# Patient Record
Sex: MALE | Race: WHITE | NOT HISPANIC OR LATINO | Employment: OTHER | URBAN - METROPOLITAN AREA
[De-identification: names, ages, dates, MRNs, and addresses within clinical notes are randomized per-mention and may not be internally consistent; named-entity substitution may affect disease eponyms.]

---

## 2019-06-10 ENCOUNTER — TRANSCRIBE ORDERS (OUTPATIENT)
Dept: PHYSICAL THERAPY | Facility: CLINIC | Age: 64
End: 2019-06-10

## 2019-06-10 ENCOUNTER — EVALUATION (OUTPATIENT)
Dept: PHYSICAL THERAPY | Facility: CLINIC | Age: 64
End: 2019-06-10
Payer: MEDICARE

## 2019-06-10 DIAGNOSIS — Z96.652 S/P TKR (TOTAL KNEE REPLACEMENT), LEFT: Primary | ICD-10-CM

## 2019-06-10 PROCEDURE — 97140 MANUAL THERAPY 1/> REGIONS: CPT | Performed by: PHYSICAL THERAPIST

## 2019-06-10 PROCEDURE — 97110 THERAPEUTIC EXERCISES: CPT | Performed by: PHYSICAL THERAPIST

## 2019-06-10 PROCEDURE — 97010 HOT OR COLD PACKS THERAPY: CPT | Performed by: PHYSICAL THERAPIST

## 2019-06-10 PROCEDURE — 97162 PT EVAL MOD COMPLEX 30 MIN: CPT | Performed by: PHYSICAL THERAPIST

## 2019-06-12 ENCOUNTER — OFFICE VISIT (OUTPATIENT)
Dept: PHYSICAL THERAPY | Facility: CLINIC | Age: 64
End: 2019-06-12
Payer: MEDICARE

## 2019-06-12 DIAGNOSIS — Z96.652 S/P TKR (TOTAL KNEE REPLACEMENT), LEFT: Primary | ICD-10-CM

## 2019-06-12 PROCEDURE — 97010 HOT OR COLD PACKS THERAPY: CPT

## 2019-06-12 PROCEDURE — 97110 THERAPEUTIC EXERCISES: CPT

## 2019-06-12 PROCEDURE — 97140 MANUAL THERAPY 1/> REGIONS: CPT

## 2019-06-13 ENCOUNTER — OFFICE VISIT (OUTPATIENT)
Dept: PHYSICAL THERAPY | Facility: CLINIC | Age: 64
End: 2019-06-13
Payer: MEDICARE

## 2019-06-13 DIAGNOSIS — Z96.652 S/P TKR (TOTAL KNEE REPLACEMENT), LEFT: Primary | ICD-10-CM

## 2019-06-13 PROCEDURE — 97140 MANUAL THERAPY 1/> REGIONS: CPT

## 2019-06-13 PROCEDURE — 97110 THERAPEUTIC EXERCISES: CPT

## 2019-06-13 PROCEDURE — 97010 HOT OR COLD PACKS THERAPY: CPT

## 2019-06-17 ENCOUNTER — OFFICE VISIT (OUTPATIENT)
Dept: PHYSICAL THERAPY | Facility: CLINIC | Age: 64
End: 2019-06-17
Payer: MEDICARE

## 2019-06-17 DIAGNOSIS — Z96.652 S/P TKR (TOTAL KNEE REPLACEMENT), LEFT: Primary | ICD-10-CM

## 2019-06-17 PROCEDURE — 97110 THERAPEUTIC EXERCISES: CPT

## 2019-06-17 PROCEDURE — 97140 MANUAL THERAPY 1/> REGIONS: CPT

## 2019-06-17 PROCEDURE — 97010 HOT OR COLD PACKS THERAPY: CPT

## 2019-06-19 ENCOUNTER — OFFICE VISIT (OUTPATIENT)
Dept: PHYSICAL THERAPY | Facility: CLINIC | Age: 64
End: 2019-06-19
Payer: MEDICARE

## 2019-06-19 DIAGNOSIS — Z96.652 S/P TKR (TOTAL KNEE REPLACEMENT), LEFT: Primary | ICD-10-CM

## 2019-06-19 PROCEDURE — 97110 THERAPEUTIC EXERCISES: CPT | Performed by: PHYSICAL THERAPIST

## 2019-06-19 PROCEDURE — 97140 MANUAL THERAPY 1/> REGIONS: CPT | Performed by: PHYSICAL THERAPIST

## 2019-06-21 ENCOUNTER — OFFICE VISIT (OUTPATIENT)
Dept: PHYSICAL THERAPY | Facility: CLINIC | Age: 64
End: 2019-06-21
Payer: MEDICARE

## 2019-06-21 DIAGNOSIS — Z96.652 S/P TKR (TOTAL KNEE REPLACEMENT), LEFT: Primary | ICD-10-CM

## 2019-06-21 PROCEDURE — 97140 MANUAL THERAPY 1/> REGIONS: CPT | Performed by: PHYSICAL THERAPIST

## 2019-06-21 PROCEDURE — 97110 THERAPEUTIC EXERCISES: CPT | Performed by: PHYSICAL THERAPIST

## 2019-06-24 ENCOUNTER — OFFICE VISIT (OUTPATIENT)
Dept: PHYSICAL THERAPY | Facility: CLINIC | Age: 64
End: 2019-06-24
Payer: MEDICARE

## 2019-06-24 DIAGNOSIS — Z96.652 S/P TKR (TOTAL KNEE REPLACEMENT), LEFT: Primary | ICD-10-CM

## 2019-06-24 PROCEDURE — 97140 MANUAL THERAPY 1/> REGIONS: CPT

## 2019-06-24 PROCEDURE — 97110 THERAPEUTIC EXERCISES: CPT

## 2019-06-28 ENCOUNTER — OFFICE VISIT (OUTPATIENT)
Dept: PHYSICAL THERAPY | Facility: CLINIC | Age: 64
End: 2019-06-28
Payer: MEDICARE

## 2019-06-28 DIAGNOSIS — Z96.652 S/P TKR (TOTAL KNEE REPLACEMENT), LEFT: Primary | ICD-10-CM

## 2019-06-28 PROCEDURE — 97110 THERAPEUTIC EXERCISES: CPT

## 2019-06-28 PROCEDURE — 97140 MANUAL THERAPY 1/> REGIONS: CPT

## 2019-07-01 ENCOUNTER — OFFICE VISIT (OUTPATIENT)
Dept: PHYSICAL THERAPY | Facility: CLINIC | Age: 64
End: 2019-07-01
Payer: MEDICARE

## 2019-07-01 DIAGNOSIS — Z96.652 S/P TKR (TOTAL KNEE REPLACEMENT), LEFT: Primary | ICD-10-CM

## 2019-07-01 PROCEDURE — 97110 THERAPEUTIC EXERCISES: CPT

## 2019-07-01 PROCEDURE — 97140 MANUAL THERAPY 1/> REGIONS: CPT

## 2019-07-01 NOTE — PROGRESS NOTES
Daily Note     Today's date: 2019  Patient name: Ricardo Sherman  : 1955  MRN: [id number]  Referring provider: Bruce Bustos MD  Dx:   Encounter Diagnosis     ICD-10-CM    1  S/P TKR (total knee replacement), left Z96 652        Start Time: 0800  Stop Time: 0915  Total time in clinic (min): 75 minutes    Subjective: Pt continues with stiffness of his L knee  Objective: See treatment diary below  [facility]    PROM/AAROM 5  5  10 10  5   Slide stretch 5  5  5  5  5   Seated stretch 5  5  ----> --->  5                       Exercise Diary     Quad sets 40X  40x  40x  40x  40/40   Strap stretch  5X 30'    5x 30"  5x 30"  3X 60'   Heel slides   3#  3#   2#    T-band press LV4 215  L4 2/15  L5 2/15  L5 2/15  L% 3/15   SLR w hangs 5/5 2 hangs #3  5/5 2 hangs  5/5 2  Hangs 3# 3# 5/5  2 hangs   2# 5/5    2 hangs   SAQ 3# 3/10  3# 3/10  3# 3/10  3# 3/10  2# 4/10   LAQ 3# 3/10  3# 3/10  3# 3/10  3# 3/10  --->   T-band hams L4 2/10  L4 2/10  L4 2/10  L4 2/10  LV4 3/10    T-band TKE  LV4 3/10  L4 3/10  L4 3/10  L4 3/10 L4 3/10    Standing             Calf raise    Chair squat 2/10  xxxx  xxxx xxxxx  3/10   Ball squat opt        Lunge         5X 20'                 Bike seat 19 15 min LV3 seat 18-23 15m L3     15m L3  15m L3  L# 15m                 Nu Step  Seat 10-8   ------> -------> ---->      TM    10m 1  3mph  10m 1  8mph  10m   1  8mph                         Modalities      CP post  tk  15  15 15          Assessment: Tolerated treatment well  Patient exhibited good technique with therapeutic exercises      Plan: Continue per plan of care

## 2019-07-05 ENCOUNTER — OFFICE VISIT (OUTPATIENT)
Dept: PHYSICAL THERAPY | Facility: CLINIC | Age: 64
End: 2019-07-05
Payer: MEDICARE

## 2019-07-05 DIAGNOSIS — Z96.652 S/P TKR (TOTAL KNEE REPLACEMENT), LEFT: Primary | ICD-10-CM

## 2019-07-05 PROCEDURE — 97110 THERAPEUTIC EXERCISES: CPT

## 2019-07-05 PROCEDURE — 97140 MANUAL THERAPY 1/> REGIONS: CPT

## 2019-07-05 NOTE — PROGRESS NOTES
Daily Note     Today's date: 2019  Patient name: Hernandez Baker  : 1955  MRN: [id number]  Referring provider: Hussein Fernandez MD  Dx:   Encounter Diagnosis     ICD-10-CM    1  S/P TKR (total knee replacement), left Z96 652        Start Time: 0800  Stop Time: 09  Total time in clinic (min): 65 minutes    Subjective: Patient stated that he had seem surgeon and was instructed to continue with PT , emphasis on quad strengthening  Surgeon was pleased with patient progress  Objective: PTA directed patient in TE program, See treatment diary below  Modified TE program, due to patient time restraints  Assessment: Tolerated treatment well  Patient exhibited good technique with therapeutic exercises, no pain post treat  Plan: Continue per plan of care  Manual     PROM/AAROM 5  5  10 10  10   Slide stretch 5  5  5  5  5   Seated stretch 5  5  ----> --->                         Exercise Diary   7   Quad sets 40X  40x  40x  40x  40   Strap stretch  5X 30'    5x 30"  5x 30"  5X 30'   Heel slides   3#  3#   3#    T-band press LV4 2/15  L4 2/15  L5 2/15  L5 2/15 --->   SLR w hangs 5/5 2 hangs #3  5/5 2 hangs  5/5 2  Hangs 3# 3# 5/5  2 hangs   3# 5/5    2 hangs   SAQ 3# 3/10  3# 3/10  3# 3/10  3# 3/10 --->   LAQ 3# 3/10  3# 3/10  3# 3/10  3# 3/10 3# 3/10   T-band hams L4 2/10  L4 2/10  L4 2/10  L4 2/10  LV4 3/10    T-band TKE  LV4 3/10  L4 3/10  L4 3/10  L4 3/10 L4 3/10    Standing             Calf raise    Chair squat 2/10  xxxx  xxxx xxxxx xxxxx   Antione Kanner squat opt        Lunge         5X 20'                 Bike seat 19 15 min LV3 seat 18-23 15m L3     15m L3  15m L3  L3 15m                 Nu Step  Seat 10-8   ------> -------> ---->      TM    10m 1  3mph  10m 1  8mph  10m   1  8mph  --->                       Modalities   7/   CP post  tk  15  15 15 declined

## 2019-07-08 ENCOUNTER — OFFICE VISIT (OUTPATIENT)
Dept: PHYSICAL THERAPY | Facility: CLINIC | Age: 64
End: 2019-07-08
Payer: MEDICARE

## 2019-07-08 DIAGNOSIS — Z96.652 S/P TKR (TOTAL KNEE REPLACEMENT), LEFT: Primary | ICD-10-CM

## 2019-07-08 PROCEDURE — 97110 THERAPEUTIC EXERCISES: CPT

## 2019-07-08 PROCEDURE — 97140 MANUAL THERAPY 1/> REGIONS: CPT

## 2019-07-08 NOTE — PROGRESS NOTES
Daily Note     Today's date: 2019  Patient name: Madelaine Cesar  : 1955  MRN: [id number]  Referring provider: Hernandez Greenberg MD  Dx:   Encounter Diagnosis     ICD-10-CM    1  S/P TKR (total knee replacement), left Z96 652        Start Time: 0800  Stop Time: 0615  Total time in clinic (min): 1335 minutes    Subjective:Pt states he is pleased with is progress thus far  Objective: See treatment diary below      Assessment: Tolerated treatment well  Patient exhibited good technique with therapeutic exercises      Plan: Continue per plan of care  Manual     PROM/AAROM 10  5  10 10  10   Slide stretch 5  5  5  5  5   Seated stretch   5  ----> --->                         Exercise Diary     Quad sets 40x  40x  40x  40x  40   Strap stretch  5x 30'    5x 30"  5x 30"  5X 30'   Heel slides 3#   3#  3#   3#    T-band press L5 2/15  L4 2/15  L5 2/15  L5 2/15 --->   SLR w hangs 3# 5/5   2 hangs  5/5 2 hangs  5/5 2  Hangs 3# 3# 5/5  2 hangs   3# 5/5    2 hangs   SAQ 4# 3/10  3# 3/10  3# 3/10  3# 3/10 --->   LAQ 4# 3/10  3# 3/10  3# 3/10  3# 3/10 3# 3/10   T-band hams L5 2/10  L4 2/10  L4 2/10  L4 2/10  LV4 3/10    T-band TKE  L4 3/10  L4 3/10  L4 3/10  L4 3/10 L4 3/10    Standing             Calf raise    Chair squat xxxx  xxxx  xxxx xxxxx xxxxx   [place] squat opt  2/10 2/20 2/20  2/20     Lunge  xxxx       5X 20'                 Bike seat  15 min L3 s- 18-23 15m L3     15m L3  15m L3  L3 15m                 Nu Step  Seat 10-8 -------> ------> -------> ---->      TM  15m  1 8 mph  10m 1  3mph  10m 1  8mph  10m   1  8mph  --->                       Modalities   7   CP post 15  15  15 15 declined

## 2019-07-10 ENCOUNTER — OFFICE VISIT (OUTPATIENT)
Dept: PHYSICAL THERAPY | Facility: CLINIC | Age: 64
End: 2019-07-10
Payer: MEDICARE

## 2019-07-10 DIAGNOSIS — Z96.652 S/P TKR (TOTAL KNEE REPLACEMENT), LEFT: Primary | ICD-10-CM

## 2019-07-10 PROCEDURE — 97110 THERAPEUTIC EXERCISES: CPT

## 2019-07-10 PROCEDURE — 97140 MANUAL THERAPY 1/> REGIONS: CPT

## 2019-07-10 NOTE — PROGRESS NOTES
Daily Note     Today's date: 7/10/2019  Patient name: Mazin Sanabria  : 1955  MRN: [id number]  Referring provider: Amita Abbott MD  Dx:   Encounter Diagnosis     ICD-10-CM    1  S/P TKR (total knee replacement), left Z96 652        Start Time: 0800  Stop Time: 0915  Total time in clinic (min): 75 minutes    Subjective: Pt feels he is making progress with his knee extension  Objective: See treatment diary below      Assessment: Tolerated treatment well  Patient exhibited good technique with therapeutic exercises      Plan: Continue per plan of care  Manual  7/8 7/10 6/28  7/1 7/5   PROM/AAROM 10  15  10 10  10   Slide stretch 5  xx  5  5  5   Seated stretch  --->  ----> --->                         Exercise Diary  7/8 7/10  6/28  7/1 7/5   Quad sets 40x  40x  40x  40x  40   Strap stretch  5x 30'  5x 30"  5x 30"  5x 30"  5X 30'   Heel slides 3#   3#   3#  3#   3#    T-band press L5 2/15  L5 2/15  L5 2/15  L5 2/15 --->   SLR w hangs 3# 5/5   2 hangs  3# 5/5 2 hangs  5/5 2  Hangs 3# 3# 5/5  2 hangs   3# 5/5    2 hangs   SAQ 4# 3/10  5# 3/10  3# 3/10  3# 3/10 --->   LAQ 4# 3/10  5# 3/10  3# 3/10  3# 3/10 3# 3/10   T-band hams L5 2/10  L5 3/10  L4 2/10  L4 2/10  LV4 3/10    T-band TKE  L4 3/10  L5 2/10  L4 3/10  L4 3/10 L4 3/10    Standing             Calf raise    Chair squat xxxx  xxxx  xxxx xxxxx xxxxx   [place] squat opt  2/10 2/20 2/20  2/20     Lunge  xxxx       5X 20'                  Bike seat  15m L3 15m L3     15m L3  15m L3  L3 15m                 Nu Step  Seat 10-8 -------> ------> -------> ---->      TM  15m  1 8 mph  15m 1  8mph  10m 1  8mph  10m   1  8mph  --->                       Modalities 7/8  7/10  6/28  7/1  7   CP post 15  12  15 15 declined

## 2019-07-12 ENCOUNTER — APPOINTMENT (OUTPATIENT)
Dept: PHYSICAL THERAPY | Facility: CLINIC | Age: 64
End: 2019-07-12
Payer: MEDICARE

## 2019-07-15 ENCOUNTER — OFFICE VISIT (OUTPATIENT)
Dept: PHYSICAL THERAPY | Facility: CLINIC | Age: 64
End: 2019-07-15
Payer: MEDICARE

## 2019-07-15 DIAGNOSIS — Z96.652 S/P TKR (TOTAL KNEE REPLACEMENT), LEFT: Primary | ICD-10-CM

## 2019-07-15 PROCEDURE — 97110 THERAPEUTIC EXERCISES: CPT

## 2019-07-15 PROCEDURE — 97140 MANUAL THERAPY 1/> REGIONS: CPT

## 2019-07-15 NOTE — PROGRESS NOTES
Daily Note     Today's date: 7/15/2019  Patient name: Michael Corley  : 1955  MRN: [id number]  Referring provider: Froylan Arthur MD  Dx:   Encounter Diagnosis     ICD-10-CM    1  S/P TKR (total knee replacement), left Z96 652        Start Time: 0800  Stop Time: 0915  Total time in clinic (min): 75 minutes    Subjective: MD F/VANESSA 9/3/2019  Pt states he has been able to vivian increased home ADL's  Objective: See treatment diary below      Assessment: Tolerated treatment well  Patient exhibited good technique with therapeutic exercises including transition to lf for press/hamstring  Plan: Continue per plan of care  Manual  7/8 7/10 7/15  7/1 7/5   PROM/AAROM 10  15 15 10  10   Slide stretch 5  xx xx  5  5   Seated stretch  --->  ----> --->                         Exercise Diary  7/8 7/10  7/15  7/1 7/5   Quad sets 40x  40x  40x  40x  40   Strap stretch  5x 30'  5x 30"  5x 30"  5x 30"  5X 30'   Heel slides 3# 2/20  3# 2/20  3# 2/20 3# 2/20  3# 2/20   T-band press L5 2/15  L5 2/15  lf 35# 2/10  L5 15 --->   SLR w hangs 3# 5/5   2 hangs  3# 5/5 2 hangs 4# 5/5  2 hangs 3# 5/5  2 hangs   3# 5/5    2 hangs   SAQ 4# 3/10  5# 3/10  5# 3/10  3# 3/10 --->   LAQ 4# 3/10  5# 3/10  5# 3/10  3# 3/10 3# 3/10   T-band hams L5 2/10  L5 3/10  lf 40# 2/10  L4 2/10  LV4 3/10    T-band TKE  L4 3/10  L5 2/10  L5 3/10  L4 3/10 L4 3/10    Standing             Calf raise    Chair squat xxxx  xxxx  xxxx xxxxx xxxxx   [place] squat opt  2/10 2/20 ----->       Lunge  xxxx       5X 20'                  Bike seat  15m L3 15m L3     15m L3  15m L3  L3 15m                 Nu Step  Seat 10-8 -------> ------> -------> ---->      TM  15m  1 8 mph  15m 1  8mph  10m 1  8mph  10m   1  8mph  --->                       Modalities 7/8  7/10  7/15  7   CP post 15  12  15 15 declined

## 2019-07-17 ENCOUNTER — OFFICE VISIT (OUTPATIENT)
Dept: PHYSICAL THERAPY | Facility: CLINIC | Age: 64
End: 2019-07-17
Payer: MEDICARE

## 2019-07-17 ENCOUNTER — TRANSCRIBE ORDERS (OUTPATIENT)
Dept: PHYSICAL THERAPY | Facility: CLINIC | Age: 64
End: 2019-07-17

## 2019-07-17 DIAGNOSIS — Z96.652 S/P TKR (TOTAL KNEE REPLACEMENT), LEFT: Primary | ICD-10-CM

## 2019-07-17 DIAGNOSIS — Z96.652 S/P TKR (TOTAL KNEE REPLACEMENT) NOT USING CEMENT, LEFT: Primary | ICD-10-CM

## 2019-07-17 PROCEDURE — 97110 THERAPEUTIC EXERCISES: CPT | Performed by: PHYSICAL THERAPIST

## 2019-07-17 PROCEDURE — 97140 MANUAL THERAPY 1/> REGIONS: CPT | Performed by: PHYSICAL THERAPIST

## 2019-07-17 NOTE — PROGRESS NOTES
PT Re-Evaluation     Today's date: 2019  Patient name: Veronique Castaneda  : 1955  MRN: [id number]  Referring provider: Aracelis Wiley MD  Dx:   Encounter Diagnosis     ICD-10-CM    1  S/P TKR (total knee replacement), left Z96 652        Start Time: 08  Stop Time: 915  Total time in clinic (min): 70 minutes    Assessment  Assessment details: Veronique Castaneda 70 y o  male post left TKR 2019  The patient is doing well, but concerned about his extension lag  He still reports stiffness in the mornings or after extended weight bearing activity  However, the patient is functioning well, including climbing ladders and walking  He has not returned to recreational cycling at this time  Left  Knee Pain rated  2 /10  at worse during passive stretching  Minimal to no swelling L knee  L Knee PROM: F  118   E    -3  L knee AROM: F 112  E -5  L Knee Strength: F  +4  E +4  Patient is ambulating without device, and presents a fairly normal gait pattern  He has been able to ascend and descend stairs reciprocally with hand rail  Patient is active, and enjoys cycling  He would like to resume this activity when able and permitted  Goals  STG  Reduce and control left kne pain and swelling - day one and ongoing  Increase active ROM 0 - 110 2 weeks - ACHIEVED  Increase strength left knee 1/2 grade 3 weeks - ACHIEVED  LTG  Maximize active ROM left knee 0-120 by d/c  Maximize strength left knee 5/5  Promote reciprocal stair climbing in descent - Achieved  Resume normal functional and recreational activity - Partial       Plan  Plan details: Continue 2X per week 2-3 weeks  MT - stretching  TE - ROM and advanced strengthening         Subjective  The patient is doing well, but concerned about his extension lag  He still reports stiffness in the mornings or after extended weight bearing activity      Objective   L Knee PROM: F  118   E    -3  L knee AROM: F 112  E -5  L Knee Strength: F  +4  E +4 Manual  7/8 7/10 7/15  7/17 7/5   PROM/AAROM 10  15 15 10  10   Slide stretch 5  xx xx  5  5   Seated stretch   --->  ----> --->                         Exercise Diary  7/8 7/10  7/15  7/17 7/5   Quad sets 40x  40x  40x  40x  40   Strap stretch  5x 30'  5x 30"  5x 30"  5x 30"  5X 30'   Heel slides 3# 2/20  3# 2/20  3# 2/20 2/20  3# 2/20   T-band press L5 2/15  L5 2/15  lf 35# 2/10  L5 2/15 --->   SLR w hangs 3# 5/5   2 hangs  3# 5/5 2 hangs 4# 5/5  2 hangs 3# 5/5  2 hangs   3# 5/5    2 hangs   SAQ 4# 3/10  5# 3/10  5# 3/10  3# 3/10 --->   LAQ 4# 3/10  5# 3/10  5# 3/10  3# 3/10 3# 3/10   T-band hams L5 2/10  L5 3/10  lf 40# 2/10  xxxxx-LF  LV4 3/10   LF Leg Curl     45# 3/10    LF Leg Press     50# 3/10     T-band TKE  L4 3/10  L5 2/10  L5 3/10  L4 3/10 L4 3/10    Standing             Calf raise 2/20 2/20 2/20 2/20 March 2/20 2/20 2/20 2/20 2/20   Chair squat xxxx  xxxx  xxxx xxxxx xxxxx   Janell Kingman squat opt  2/10 2/20 ----->  2/20     Lunge  xxxx       5X 20'                    Bike seat  15m L3 15m L3     15m L3  15m L3  L3 15m                 Nu Step  Seat 10-8 -------> ------> -------> ---->      TM  15m  1 8 mph  15m 1  8mph  10m 1  8mph  10m   1  8mph  --->                       Modalities 7/8  7/10  7/15  7/17  7/5   CP post 15  12  15 15 declined

## 2019-07-17 NOTE — LETTER
2019    Vasiliy Rosario MD  [address] [place] [id number]    Patient: Wilfred Pardo   YOB: 1955   Date of Visit: 2019     Encounter Diagnosis     ICD-10-CM    1  S/P TKR (total knee replacement), left F99 070        Dear Dr Black Even:    Please review the attached Plan of Care from [facility] recent visit  Please verify that you agree therapy should continue by signing the attached document and sending it back to our office  If you have any questions or concerns, please don't hesitate to call  Sincerely,    Franco Delgadillo, PT      Referring Provider:      I certify that I have read the below Plan of Care and certify the need for these services furnished under this plan of treatment while under my care  Vasiliy Rosario MD  [address] [place] [id number]  VIA Facsimile: [phone]          PT Re-Evaluation     Today's date: 2019  Patient name: Wilfred Pardo  : 1955  MRN: [id number]  Referring provider: Maria Guadalupe Ulrich MD  Dx:   Encounter Diagnosis     ICD-10-CM    1  S/P TKR (total knee replacement), left Z96 652        Start Time: 08  Stop Time: 915  Total time in clinic (min): 70 minutes    Assessment  Assessment details: Wilfred Pardo 70 y o  male post left TKR 2019  The patient is doing well, but concerned about his extension lag  He still reports stiffness in the mornings or after extended weight bearing activity  However, the patient is functioning well, including climbing ladders and walking  He has not returned to recreational cycling at this time  Left  Knee Pain rated  2 /10  at worse during passive stretching  Minimal to no swelling L knee  L Knee PROM: F  118   E    -3  L knee AROM: F 112  E -5  L Knee Strength: F  +4  E +4  Patient is ambulating without device, and presents a fairly normal gait pattern  He has been able to ascend and descend stairs reciprocally with hand rail  Patient is active, and enjoys cycling  He would like to resume this activity when able and permitted  Goals  STG  Reduce and control left kne pain and swelling - day one and ongoing  Increase active ROM 0 - 110 2 weeks - ACHIEVED  Increase strength left knee 1/2 grade 3 weeks - ACHIEVED  LTG  Maximize active ROM left knee 0-120 by d/c  Maximize strength left knee 5/5  Promote reciprocal stair climbing in descent - Achieved  Resume normal functional and recreational activity - Partial       Plan  Plan details: Continue 2X per week 2-3 weeks  MT - stretching  TE - ROM and advanced strengthening         Subjective  The patient is doing well, but concerned about his extension lag  He still reports stiffness in the mornings or after extended weight bearing activity      Objective   L Knee PROM: F  118   E    -3  L knee AROM: F 112  E -5  L Knee Strength: F  +4  E +4         Manual  7/8 7/10 7/15  7/17 7/5   PROM/AAROM 10  15 15 10  10   Slide stretch 5  xx xx  5  5   Seated stretch   --->  ----> --->                         Exercise Diary  7/8 7/10  7/15  7/17 7/5   Quad sets 40x  40x  40x  40x  40   Strap stretch  5x 30'  5x 30"  5x 30"  5x 30"  5X 30'   Heel slides 3# 2/20  3# 2/20  3# 2/20 2/20  3# 2/20   T-band press L5 2/15  L5 2/15  lf 35# 2/10  L5 2/15 --->   SLR w hangs 3# 5/5   2 hangs  3# 5/5 2 hangs 4# 5/5  2 hangs 3# 5/5  2 hangs   3# 5/5    2 hangs   SAQ 4# 3/10  5# 3/10  5# 3/10  3# 3/10 --->   LAQ 4# 3/10  5# 3/10  5# 3/10  3# 3/10 3# 3/10   T-band hams L5 2/10  L5 3/10  lf 40# 2/10  xxxxx-LF  LV4 3/10   LF Leg Curl     45# 3/10    LF Leg Press     50# 3/10     T-band TKE  L4 3/10  L5 2/10  L5 3/10  L4 3/10 L4 3/10    Standing             Calf raise 2/20 2/20 2/20 2/20 March 2/20 2/20 2/20  2/20  2/20   Chair squat xxxx  xxxx  xxxx xxxxx xxxxx   Duglas Reed squat opt  2/10 2/20 ----->  2/20     Lunge  xxxx       5X 20'                    Bike seat  15m L3 15m L3     15m L3  15m L3  L3 15m                 Nu Step  Seat 10-8 -------> ------> -------> ---->      TM  15m  1 8 mph  15m 1  8mph  10m 1  8mph  10m   1  8mph  --->                       Modalities 7/8  7/10  7/15  7/17  7/5   CP post 15  12  15 15 declined

## 2019-07-19 ENCOUNTER — OFFICE VISIT (OUTPATIENT)
Dept: PHYSICAL THERAPY | Facility: CLINIC | Age: 64
End: 2019-07-19
Payer: MEDICARE

## 2019-07-19 DIAGNOSIS — Z96.652 S/P TKR (TOTAL KNEE REPLACEMENT), LEFT: Primary | ICD-10-CM

## 2019-07-19 PROCEDURE — 97110 THERAPEUTIC EXERCISES: CPT | Performed by: PHYSICAL THERAPIST

## 2019-07-19 NOTE — PROGRESS NOTES
Daily Note     Today's date: 2019  Patient name: Wilfred Pardo  : 1955  MRN: [id number]  Referring provider: Maria Guadalupe Ulrich MD  Dx:   Encounter Diagnosis     ICD-10-CM    1  S/P TKR (total knee replacement), left Z96 652        Start Time: 0800  Stop Time: 0905  Total time in clinic (min): 65 minutes    Subjective: Patient states that he is doing well  Objective: PT repeated passive stretching left knee, and directed     Assessment: Tolerated treatment well  Plan: Continue per plan of care  {  Manual  7/8 7/10 7/15  7/17 7/19   PROM/AAROM 10  15 15 10  10   Slide stretch 5  xx xx  5     Seated stretch   --->  ----> --->                         Exercise Diary  7/8 7/10  7/15  7/17 7/19   Quad sets 40x  40x  40x  40x  40   Strap stretch  5x 30'  5x 30"  5x 30"  5x 30"  5X 30'   Heel slides 3#   3#   3#   3#    T-band press L5 2/15  L5 2/15  lf 35# 2/10  L5 2/15 --->   SLR w hangs 3# 5/5   2 hangs  3# 5/5 2 hangs 4# 5/5  2 hangs 3# 5/5  2 hangs   3# 5/5    2 hangs   SAQ 4# 3/10  5# 3/10  5# 3/10  3# 3/10 --->   LAQ 4# 3/10  5# 3/10  5# 3/10  3# 3/10 7# 3/10   T-band hams L5 2/10  L5 3/10  lf 40# 2/10  xxxxx-LF  LV4 3/10   LF Leg Curl        45# 3/10  45# 3/10   LF Leg Press        50# 3/10  50# 3/10    T-band TKE  L4 3/10  L5 2/10  L5 3/10  L4 3/10 L4 3/10    Standing             Calf raise    Chair squat xxxx  xxxx  xxxx xxxxx xxxxx   Fredis Glow squat opt  2/10 2/20 ----->       Lunge  xxxx       5X 20'                    Bike seat  15m L3 15m L3     15m L3  15m L3  L3 15m                 Nu Step  Seat 10-8 -------> ------> -------> ---->      TM  15m  1 8 mph  15m 1  8mph  10m 1  8mph  10m   1  8mph  10m 2 0 mph                       Modalities 7/8  7/10  7/15  7/17  7   CP post 15  12  15 15 declined

## 2019-07-22 ENCOUNTER — OFFICE VISIT (OUTPATIENT)
Dept: PHYSICAL THERAPY | Facility: CLINIC | Age: 64
End: 2019-07-22
Payer: MEDICARE

## 2019-07-22 DIAGNOSIS — Z96.652 S/P TKR (TOTAL KNEE REPLACEMENT), LEFT: Primary | ICD-10-CM

## 2019-07-22 PROCEDURE — 97110 THERAPEUTIC EXERCISES: CPT

## 2019-07-22 PROCEDURE — 97140 MANUAL THERAPY 1/> REGIONS: CPT

## 2019-07-22 NOTE — PROGRESS NOTES
Daily Note     Today's date: 2019  Patient name: Grace Storm  : 1955  MRN: [id number]  Referring provider: Shania Cheung MD  Dx:   Encounter Diagnosis     ICD-10-CM    1  S/P TKR (total knee replacement), left Z96 652        Start Time: 0800  Stop Time: 0910  Total time in clinic (min): 70 minutes    Subjective: Pt reports he has performed       Objective: See treatment diary below      Assessment: Tolerated treatment well  Patient exhibited good technique with therapeutic exercises      Plan: Continue per plan of care  {  Manual  7/22 7/10 7/15  7/17 7/19   PROM/AAROM 10  15 15 10  10   Slide stretch 5  xx xx  5     Seated stretch   --->  ----> --->                         Exercise Diary  7/22 7/10  7/15  7/17 7/19   Quad sets 40x  40x  40x  40x  40   Strap stretch  5x 30'  5x 30"  5x 30"  5x 30"  5X 30'   Heel slides 4#   3#   3#   3#    T-band press L5 /15  L5 /15  lf 35# 2/10  L5 15 --->   SLR w hangs 4# 5/5   2 hangs  3# 5/5 2 hangs 4# 5/5  2 hangs 3# 5/5  2 hangs   3# 5/5    2 hangs   SAQ 5# 3/10  5# 3/10  5# 3/10  3# 3/10 --->   LAQ 5# 3/10  5# 3/10  5# 3/10  3# 3/10 7# 3/10   T-band hams xxx  L5 3/10  lf 40# 2/10  xxxxx-LF xxxxx   LF Leg Curl  55 3/10      45# 3/10  45# 3/10   LF Leg Press  50 3/10      50# 3/10  50# 3/10    T-band TKE home  L5 2/10  L5 3/10  L4 3/10 L4 3/10    Standing             Calf raise    Chair squat xxxx  xxxx  xxxx xxxxx xxxxx   Addie Parish squat opt  2/10 2/20 ----->       Lunge  xxxx       5X 20'                    Bike 15m L3 15m L3     15m L3  15m L3  L3 15m                 Nu Step  Seat 10-8 -------> ------> -------> ---->      TM  15m  1 8 mph  15m 1  8mph  10m 1  8mph  10m   1  8mph  10m 2 0 mph                       Modalities 7/22  7/10  7/15  7/17  7/19   CP post 15  12  15 15 declined

## 2019-07-24 ENCOUNTER — OFFICE VISIT (OUTPATIENT)
Dept: PHYSICAL THERAPY | Facility: CLINIC | Age: 64
End: 2019-07-24
Payer: MEDICARE

## 2019-07-24 DIAGNOSIS — Z96.652 S/P TKR (TOTAL KNEE REPLACEMENT), LEFT: Primary | ICD-10-CM

## 2019-07-24 PROCEDURE — 97140 MANUAL THERAPY 1/> REGIONS: CPT

## 2019-07-24 PROCEDURE — 97110 THERAPEUTIC EXERCISES: CPT

## 2019-07-24 NOTE — PROGRESS NOTES
Daily Note     Today's date: 2019  Patient name: Mazin Sanabria  : 1955  MRN: [id number]  Referring provider: Amita Abbott MD  Dx:   Encounter Diagnosis     ICD-10-CM    1  S/P TKR (total knee replacement), left Z96 [id number]        Start Time: 0800  Stop Time: 0910  Total time in clinic (min): 70 minutes    Subjective: Pt states he has resumed his HEP and notes slight increased ROM of the L knee  Mildly increased stiffness this morning  Pt comments on "missing the last step" at home yesterday; slight L knee soreness noted for a short time  Objective: See treatment diary below  Pt decreased the level with the recumbent bike today due to knee stiffness  PTA discussed using static stretching at home  Assessment: Tolerated treatment well  Patient exhibited good technique with therapeutic exercises      Plan: Continue per plan of care        {  Manual  7/22 7/24 7/15  7/17 7/19   PROM/AAROM 10  10 15 10  10   Slide stretch 5  5 xx  5     Seated stretch   --->  ----> --->                         Exercise Diary  7/22 7/24  7/15  7/17 7/19   Quad sets 40x  40x  40x  40x  40   Strap stretch  5x 30'  5x 30"  5x 30"  5x 30"  5X 30'   Heel slides 4#   4# 20  3#   3#    SLR w hangs 4# 5/5   2 hangs  4# 5/5 2 hangs 4# 5/5  2 hangs 3# 5/5  2 hangs   3# 5/5    2 hangs   SAQ 5# 3/10  6# 3/10  5# 3/10  3# 3/10 --->   LAQ 5# 3/10  6# 3/10  5# 3/10  3# 3/10 7# 3/10   LF Leg Curl  55 3/10  60# 3/10    45# 3/10  45# 3/10   LF Leg Press  50 3/10  75# 3/10    50# 3/10  50# 3/10    T-band TKE home  L5 2/10  L5 3/10  L4 3/10 L4 3/10    Standing             Calf raise    Chair squat xxxx  xxxx  xxxx xxxxx xxxxx   [employer] squat opt  2/10 xxxxx ----->       Lunge  xxxx  xxxxxx     5X 20'                    Bike 15m L3 15m L1     15m L3  15m L3  L3 15m                 Nu Step  Seat 10-8 -------> ------> -------> ---->      TM  15m  2 0 mph  15m 2 0mph  10m 1  8mph  10m   1  8mph  10m 2 0 mph                       Modalities 7/22  7/24  7/15  7/17  7/19   CP post 15  declin  15 15 declined

## 2019-07-29 ENCOUNTER — OFFICE VISIT (OUTPATIENT)
Dept: PHYSICAL THERAPY | Facility: CLINIC | Age: 64
End: 2019-07-29
Payer: MEDICARE

## 2019-07-29 DIAGNOSIS — Z96.652 S/P TKR (TOTAL KNEE REPLACEMENT), LEFT: Primary | ICD-10-CM

## 2019-07-29 PROCEDURE — 97140 MANUAL THERAPY 1/> REGIONS: CPT

## 2019-07-29 PROCEDURE — 97110 THERAPEUTIC EXERCISES: CPT

## 2019-07-29 NOTE — PROGRESS NOTES
Daily Note     Today's date: 2019  Patient name: Alireza Mars  : 1955  MRN: [id number]  Referring provider: Jo Bolaños MD  Dx:   Encounter Diagnosis     ICD-10-CM    1  S/P TKR (total knee replacement), left [id number] [id number]        Start Time: 0800  Stop Time: 0900  Total time in clinic (min): 60 minutes    Subjective: Patient continues to do well with home program ; he is noticing small improvements with knee, such as stair climbing  Objective: PTA directed patient in LE strengthening and ROM program, See treatment diary below  Held on TM today per patient request due to amount of walking he will be doing later today at family function  Assessment: Tolerated treatment well  Patient exhibited good technique with therapeutic exercises, declined ice post treat  No pain noted with TE  Plan: Continue per plan of care        {  Manual     PROM/AAROM 10  10 10 10  10   Slide stretch 5  5 5  5     Seated stretch   --->  --->                         Exercise Diary     Quad sets 40x  40x  40x  40x  40   Strap stretch  5x 30'  5x 30"  5x 30"  5x 30"  5X 30'   Heel slides 4#   4# /20  4#   3#    SLR w hangs 4# 5/5   2 hangs  4# 5/5 2 hangs 4# 5/5  2 hangs 3# 5/5  2 hangs   3# 5/5    2 hangs   SAQ 5# 3/10  6# 3/10  6# 3/10  3# 3/10 --->   LAQ 5# 3/10  6# 3/10  6# 3/10  3# 3/10 7# 3/10   LF Leg Curl  55 3/10  60# 3/10  60# 3/10  45# 3/10  45# 3/10   LF Leg Press  50 3/10  75# 3/10  75# 3/10  50# 3/10  50# 3/10    T-band TKE home  L5 2/10  L5 3/10  L4 3/10 L4 3/10    Standing             Calf raise    Chair squat xxxx  xxxx  xxxx xxxxx xxxxx   [facility] squat opt  2/10 xxxxx xxx       Lunge  xxxx  xxxxxx  xxxx   5X 20'                    Bike 15m L3 15m L1     15m L3  15m L3  L3 15m                 Nu Step  Seat 10-8 -------> ------>  ---->      TM  15m  2 0 mph  15m 2 0mph --->  10m   1  8mph  10m 2 0 mph                       Modalities 7/22  7/24  7/15  7/17  7/19   CP post 15  declin  15 15 declined

## 2019-07-31 ENCOUNTER — OFFICE VISIT (OUTPATIENT)
Dept: PHYSICAL THERAPY | Facility: CLINIC | Age: 64
End: 2019-07-31
Payer: MEDICARE

## 2019-07-31 DIAGNOSIS — Z96.652 S/P TKR (TOTAL KNEE REPLACEMENT), LEFT: Primary | ICD-10-CM

## 2019-07-31 PROCEDURE — 97110 THERAPEUTIC EXERCISES: CPT | Performed by: PHYSICAL THERAPIST

## 2019-07-31 PROCEDURE — 97140 MANUAL THERAPY 1/> REGIONS: CPT | Performed by: PHYSICAL THERAPIST

## 2019-07-31 NOTE — PROGRESS NOTES
Daily Note     Today's date: 2019  Patient name: Mazin Sanabria  : 1955  MRN: [id number]  Referring provider: Amita Abbott MD  Dx:   Encounter Diagnosis     ICD-10-CM    1  S/P TKR (total knee replacement), left Z96 652        Start Time: 0800  Stop Time: 0900  Total time in clinic (min): 60 minutes    Subjective: Patient progressing well, but just frustrated with his lack of terminal extension in his left knee      Objective: PT performed passive stretching and directed ROM and strengthening exercises    Assessment: Tolerated treatment well  Plan: Patient will be seen once next week, and will be away the following week     {  [facility]    PROM/AAROM 10  10 10 10  10   Slide stretch 5  5 5  5     Seated stretch   --->  --->                         Exercise Diary     Quad sets 40x  40x  40x  40x  40   Strap stretch  5x 30'  5x 30"  5x 30"  5x 30"  5X 30'   Heel slides 4#   4# 20  4#   3#    SLR w hangs 4# 5/5   2 hangs  4# 5/5 2 hangs 4# 5/5  2 hangs 4# 5/5  2 hangs   3# 5/5    2 hangs   SAQ 5# 3/10  6# 3/10  6# 3/10  6# 3/10 --->   LAQ 5# 3/10  6# 3/10  6# 3/10 6# 3/10 7# 3/10   LF Leg Curl  55 3/10  60# 3/10  60# 3/10  60# 3/10  45# 3/10   LF Leg Press  50 3/10  75# 3/10  75# 3/10  75# 3/10  50# 3/10    T-band TKE home  L5 2/10  L5 3/10  L5 3/10 L4 3/10    Standing             Calf raise    Chair squat xxxx  xxxx  xxxx xxxxx xxxxx   [place] squat opt  2/10 xxxxx xxx       Lunge  xxxx  xxxxxx  xxxx   5X 20'                    Bike 15m L3 15m L1     15m L3  15m L3  L3 15m                 Nu Step  Seat 10-8 -------> ------>  ---->      TM  15m  2 0 mph  15m 2 0mph --->  10m   1  8mph  10m 2 0 mph                       Modalities    CP post 15  declin  declined 15 declined

## 2019-08-07 ENCOUNTER — OFFICE VISIT (OUTPATIENT)
Dept: PHYSICAL THERAPY | Facility: CLINIC | Age: 64
End: 2019-08-07
Payer: MEDICARE

## 2019-08-07 DIAGNOSIS — Z96.652 S/P TKR (TOTAL KNEE REPLACEMENT), LEFT: Primary | ICD-10-CM

## 2019-08-07 PROCEDURE — 97140 MANUAL THERAPY 1/> REGIONS: CPT

## 2019-08-07 PROCEDURE — 97010 HOT OR COLD PACKS THERAPY: CPT

## 2019-08-07 PROCEDURE — 97110 THERAPEUTIC EXERCISES: CPT

## 2019-08-07 NOTE — PROGRESS NOTES
Daily Note     Today's date: 2019  Patient name: Kip Eugene  : 1955  MRN: [id number]  Referring provider: Kendal Green MD  Dx:   Encounter Diagnosis     ICD-10-CM    1  S/P TKR (total knee replacement), left Z96 652        Start Time: 0800  Stop Time: 09  Total time in clinic (min): 77 minutes    Subjective: Pt reports he feels better functionally  He continues with slight extension lag  Objective: See treatment diary below      Assessment: Tolerated treatment well  Patient exhibited good technique with therapeutic exercises      Plan: Continue per plan of care  {  [facility]    PROM/AAROM 10  10 10 10  10   Slide stretch 5  5 5  5  5   Seated stretch   --->  --->                         Exercise Diary     Quad sets 40x  40x  40x  40x  40x   Strap stretch  5x 30'  5x 30"  5x 30"  5x 30"  5x 30'   Heel slides 4#   4# 20  4#   4# 20   SLR w hangs 4# 5/5   2 hangs  4# 5/5 2 hangs 4# 5/5  2 hangs 4# 5/5  2 hangs   4# 5/5    2 hangs   SAQ 5# 3/10  6# 3/10  6# 3/10  6# 3/10 6# 3/10   LAQ 5# 3/10  6# 3/10  6# 3/10 6# 3/10 6# 3/10   LF Leg Curl  55 3/10  60# 3/10  60# 3/10   60# 3/10  60 3/10   LF Leg Press  50 3/10  75# 3/10  75# 3/10   75# 3/10  75# 3/10    T-band TKE home  L5 2/10  L5 3/10  L5 3/10 L5 3/10    Standing             Calf raise    Chair squat xxxx  xxxx  xxxx xxxxx xxxxx   [place]lszew[place] squat opt  2/10 xxxxx xxx     Lunge  xxxx  xxxxxx  xxxx   xxx                    Bike 15m L3 15m L1     15m L3  15m L3  L3 15m                 Nu Step  Seat 10-8 -------> ------>  ----> ----->    TM  15m  2 0 mph  15m 2 0mph --->  10m   1  8mph  10m  2 0 mph                       Modalities     87   CP post 15  declin  declined 15 12m

## 2019-09-10 ENCOUNTER — TRANSCRIBE ORDERS (OUTPATIENT)
Dept: PHYSICAL THERAPY | Facility: CLINIC | Age: 64
End: 2019-09-10

## 2019-09-10 ENCOUNTER — OFFICE VISIT (OUTPATIENT)
Dept: PHYSICAL THERAPY | Facility: CLINIC | Age: 64
End: 2019-09-10
Payer: MEDICARE

## 2019-09-10 DIAGNOSIS — Z96.652 S/P TKR (TOTAL KNEE REPLACEMENT) NOT USING CEMENT, LEFT: Primary | ICD-10-CM

## 2019-09-10 DIAGNOSIS — Z96.652 S/P TKR (TOTAL KNEE REPLACEMENT), LEFT: Primary | ICD-10-CM

## 2019-09-10 PROCEDURE — 97110 THERAPEUTIC EXERCISES: CPT | Performed by: PHYSICAL THERAPIST

## 2019-09-10 PROCEDURE — 97161 PT EVAL LOW COMPLEX 20 MIN: CPT | Performed by: PHYSICAL THERAPIST

## 2019-09-10 PROCEDURE — 97140 MANUAL THERAPY 1/> REGIONS: CPT | Performed by: PHYSICAL THERAPIST

## 2019-09-10 NOTE — LETTER
2019    Odalis Hernandez MD  [address] [place] [id number]    Patient: Elizabeth Marquis   YOB: 1955   Date of Visit: 9/10/2019     Encounter Diagnosis     ICD-10-CM    1  S/P TKR (total knee replacement), left A12 031        Dear Dr Evelin Kimbrough:    Thank you for your recent referral of Gregery Mates  Please review the attached evaluation summary from Peter's recent visit  Please verify that you agree with the plan of care by signing the attached order  If you have any questions or concerns, please do not hesitate to call  I sincerely appreciate the opportunity to share in the care of one of your patients and hope to have another opportunity to work with you in the near future  Sincerely,    Liseth Matute, PT      Referring Provider:      I certify that I have read the below Plan of Care and certify the need for these services furnished under this plan of treatment while under my care  Odalis Hernandez MD  [address] [place] [id number]  VIA Facsimile: [phone]          PT Re-Evaluation     Today's date: 9/10/2019  Patient name: Elizabeth Marquis  : 1955  MRN: [id number]  Referring provider: Laila Whittaker MD  Dx:   Encounter Diagnosis     ICD-10-CM    1  S/P TKR (total knee replacement), left Z96 652        Start Time: 1030  Stop Time: 1135  Total time in clinic (min): 65 minutes    Assessment  Assessment details:  Elizabeth Marquis 70 y o  male post left TKR 2019  The patient has not been seen since 2019  The patient has been exercising on his own, and has resumed cycling  He expressed concerned about his left knee extension lag, and morning stiffness  As noted previously, the patient is functioning well, including climbing ladders and walking  He hasreturned to recreational cycling  Left  Knee Pain remains rated  2 /10  at worse during passive stretching into flexion > 110  Minimal to no swelling L knee  L Knee PROM: F  112   E    -1  L knee AROM: F 105  E -3  L Knee Strength: F  5  E +4  Some sluggishness in left quad set  Patient is ambulating without device, and presents a fairly normal gait pattern  He has been able to ascend and descend stairs reciprocally with hand rail  Goals  STG  Reduce and control left kne pain and swelling - day one and ongoing  Increase active ROM 0 - 110 2 weeks - ACHIEVED  Increase strength left knee 1/2 grade 3 weeks - ACHIEVED  LTG  Maximize active ROM left knee 0-120 by d/c  Maximize strength left knee 5/5  Promote reciprocal stair climbing in descent - Achieved  Resume normal functional and recreational activity - Partial      Plan  Continue 2X per week 2 weeks; transition to community wellness program due to exhaustion of insurance coverage   MT - stretching  TE - ROM and advanced strengthening             Subjective Patient has returned to PT for re-assessment    Objective   Left  Knee Pain remains rated  2 /10  at worse during passive stretching into flexion > 110  Minimal to no swelling L knee  L Knee PROM: F  112   E    -1  L knee AROM: F 105  E -3  L Knee Strength: F  5  E +4  Some sluggishness in left quad set               PT Discharge    Lindsay Troncoso will be discharged from outpatient physical therapy care due to expiration of plan of care  Pt was last seen on 9/10/2019 for reassessment  No additional visits scheduled or attended following last reassessment  No objective measures updated, Lindsay Troncoso is not present at time of discharge

## 2019-09-10 NOTE — PROGRESS NOTES
PT Re-Evaluation     Today's date: 9/10/2019  Patient name: Madelyn Mendosa  : 1955  MRN: [id number]  Referring provider: Cortney Andrews MD  Dx:   Encounter Diagnosis     ICD-10-CM    1  S/P TKR (total knee replacement), left Z96 652        Start Time: 1030  Stop Time: 1135  Total time in clinic (min): 65 minutes    Assessment  Assessment details:  Madelyn Mendosa 70 y o  male post left TKR 2019  The patient has not been seen since 2019  The patient has been exercising on his own, and has resumed cycling  He expressed concerned about his left knee extension lag, and morning stiffness  As noted previously, the patient is functioning well, including climbing ladders and walking  He hasreturned to recreational cycling  Left  Knee Pain remains rated  2 /10  at worse during passive stretching into flexion > 110  Minimal to no swelling L knee  L Knee PROM: F  112   E    -1  L knee AROM: F 105  E -3  L Knee Strength: F  5  E +4  Some sluggishness in left quad set  Patient is ambulating without device, and presents a fairly normal gait pattern  He has been able to ascend and descend stairs reciprocally with hand rail  Goals  STG  Reduce and control left kne pain and swelling - day one and ongoing  Increase active ROM 0 - 110 2 weeks - ACHIEVED  Increase strength left knee 1/2 grade 3 weeks - ACHIEVED  LTG  Maximize active ROM left knee 0-120 by d/c  Maximize strength left knee 5/5  Promote reciprocal stair climbing in descent - Achieved  Resume normal functional and recreational activity - Partial      Plan  Continue 2X per week 2 weeks; transition to community wellness program due to exhaustion of insurance coverage   MT - stretching  TE - ROM and advanced strengthening             Subjective Patient has returned to PT for re-assessment    Objective   Left  Knee Pain remains rated  2 /10  at worse during passive stretching into flexion > 110  Minimal to no swelling L knee       L Knee PROM: F  112   E    -1  L knee AROM: F 105  E -3  L Knee Strength: F  5  E +4  Some sluggishness in left quad set

## 2019-11-18 NOTE — PROGRESS NOTES
PT Discharge    Cira Ayers will be discharged from outpatient physical therapy care due to expiration of plan of care  Pt was last seen on 9/10/2019 for reassessment  No additional visits scheduled or attended following last reassessment  No objective measures updated, Cira Ayers is not present at time of discharge